# Patient Record
Sex: FEMALE
[De-identification: names, ages, dates, MRNs, and addresses within clinical notes are randomized per-mention and may not be internally consistent; named-entity substitution may affect disease eponyms.]

---

## 2019-04-20 ENCOUNTER — HOSPITAL ENCOUNTER (EMERGENCY)
Dept: HOSPITAL 25 - UCEAST | Age: 24
Discharge: HOME | End: 2019-04-20
Payer: COMMERCIAL

## 2019-04-20 VITALS — DIASTOLIC BLOOD PRESSURE: 67 MMHG | SYSTOLIC BLOOD PRESSURE: 123 MMHG

## 2019-04-20 DIAGNOSIS — L03.312: Primary | ICD-10-CM

## 2019-04-20 PROCEDURE — 99202 OFFICE O/P NEW SF 15 MIN: CPT

## 2019-04-20 PROCEDURE — G0463 HOSPITAL OUTPT CLINIC VISIT: HCPCS

## 2019-04-20 NOTE — UC
Skin Complaint HPI





- HPI Summary


HPI Summary: 


About 2 days ago patient noticed some discomfort in her in the middle of her 

back.  Today noticed some red lumps over the area of tenderness.  No fever.  No 

nausea.  Denies any trauma to the area.


  





- History of Current Complaint


Chief Complaint: UCSkin


Time Seen by Provider: 04/20/19 20:20


Stated Complaint: RASH


Hx Obtained From: Patient, Family/Caretaker - BOYFRIEND


Hx Last Menstrual Period: 3/30/19


Onset/Duration: Gradual Onset, Lasting Days, Still Present


Timing: Constant


Onset Severity: Mild


Current Severity: Mild


Pain Intensity: 1


Pain Scale Used: 0-10 Numeric


Location: Discrete - MID BACK


Character: Redness, Raised, Painful


Aggravating Factor(s): Touch


Alleviating Factor(s): Nothing


Associated Signs & Symptoms: Positive: Tenderness





- Allergy/Home Medications


Allergies/Adverse Reactions: 


 Allergies











Allergy/AdvReac Type Severity Reaction Status Date / Time


 


No Known Allergies Allergy   Verified 04/20/19 20:25











Home Medications: 


 Home Medications





Norethindrone-E.estradiol-Iron [Junel Fe 1 mg-20 Mcg Tablet] 1 tab PO DAILY 04/ 20/19 [History Confirmed 04/20/19]











PMH/Surg Hx/FS Hx/Imm Hx


Previously Healthy: Yes





- Surgical History


Surgical History: None





- Family History


Known Family History: Positive: Non-Contributory





- Social History


Alcohol Use: Weekly


Alcohol Amount: 5 drinks today


Substance Use Type: None


Smoking Status (MU): Never Smoked Tobacco





Review of Systems


All Other Systems Reviewed And Are Negative: Yes


Constitutional: Positive: Negative


Skin: Positive: Other - ERYTHEMA BACK


Respiratory: Positive: Negative


Cardiovascular: Positive: Negative


Gastrointestinal: Positive: Negative


Musculoskeletal: Negative: Decreased ROM, Myalgia





Physical Exam


Triage Information Reviewed: Yes


Appearance: Well-Appearing, No Pain Distress, Well-Nourished


Vital Signs: 


 Initial Vital Signs











Temp  99.7 F   04/20/19 20:26


 


Pulse  89   04/20/19 20:26


 


Resp  18   04/20/19 20:26


 


BP  123/67   04/20/19 20:26


 


Pulse Ox  99   04/20/19 20:26











Vital Signs Reviewed: Yes


Eyes: Positive: Conjunctiva Clear


ENT: Positive: Hearing grossly normal


Neck: Positive: Supple


Respiratory: Positive: No respiratory distress, No accessory muscle use


Cardiovascular: Positive: Pulses Normal


Abdomen Description: Positive: Soft


Musculoskeletal: Positive: No Edema


Neurological: Positive: Alert


Psychological: Positive: Age Appropriate Behavior


Skin: Positive: Other - 1.5CM AREA OF INDURATION, ERYTHEMA AND TENDERNESS MID-

LINE BACK OVERLYING SPINOUS PROCESSES X 3. NO FLUCTUANCE. NO DRAINAGE.





Course/Dx





- Diagnoses


Provider Diagnosis: 


 Cellulitis of mid back region








Discharge





- Sign-Out/Discharge


Documenting (check all that apply): Patient Departure


All imaging exams completed and their final reports reviewed: No Studies





- Discharge Plan


Condition: Stable


Disposition: HOME


Prescriptions: 


Cephalexin CAP* [Keflex 500 CAP*] 500 mg PO BID #13 cap


Patient Education Materials:  Cellulitis (ED)


Referrals: 


[facility] [Provider Group] - If Needed


Additional Instructions: 


TAKE THE ANTIBIOTICS TWICE DAILY FOR 7 DAYS.  APPLY THIN LAYER OF OTC 

ANTIBIOTIC OINTMENT 2 TIMES DAILY.  AVOID ANY NEOMYCIN CONTAINING PRODUCTS.  

SEEK REEVALUATION IF YOU ARE NOT IMPROVING OVER THE NEXT FEW DAYS WITH THIS 

TREATMENT.





BE AWARE THAT YOUR ORAL CONTRACEPTIVE PILL MAY BE LESS EFFECTIVE WHILE YOU'RE 

TAKING ANTIBIOTICS.  USE BACKUP BARRIER METHOD FOR CONTRACEPTION FOR THE 

REMAINDER OF THIS CYCLE AND YOUR NEXT CYCLE TO HELP PREVENT PREGNANCY.





- Billing Disposition and Condition


Condition: STABLE


Disposition: Home